# Patient Record
Sex: FEMALE | Race: OTHER | Employment: UNEMPLOYED | URBAN - METROPOLITAN AREA
[De-identification: names, ages, dates, MRNs, and addresses within clinical notes are randomized per-mention and may not be internally consistent; named-entity substitution may affect disease eponyms.]

---

## 2017-07-31 ENCOUNTER — OFFICE VISIT (OUTPATIENT)
Dept: FAMILY MEDICINE CLINIC | Age: 27
End: 2017-07-31

## 2017-07-31 VITALS
WEIGHT: 165.8 LBS | TEMPERATURE: 97.6 F | BODY MASS INDEX: 32.55 KG/M2 | OXYGEN SATURATION: 98 % | HEIGHT: 60 IN | SYSTOLIC BLOOD PRESSURE: 110 MMHG | HEART RATE: 73 BPM | DIASTOLIC BLOOD PRESSURE: 60 MMHG

## 2017-07-31 DIAGNOSIS — Z20.2 EXPOSURE TO STD: Primary | ICD-10-CM

## 2017-07-31 PROCEDURE — 99213 OFFICE O/P EST LOW 20 MIN: CPT | Performed by: NURSE PRACTITIONER

## 2017-07-31 RX ORDER — AZITHROMYCIN 500 MG/1
1000 TABLET, FILM COATED ORAL ONCE
Qty: 2 TABLET | Refills: 0 | Status: SHIPPED | OUTPATIENT
Start: 2017-07-31 | End: 2017-07-31

## 2017-08-01 LAB
C. TRACHOMATIS DNA ,URINE: NEGATIVE
N. GONORRHOEAE DNA, URINE: NEGATIVE

## 2017-08-01 ASSESSMENT — ENCOUNTER SYMPTOMS
EYES NEGATIVE: 1
ABDOMINAL PAIN: 1
RECTAL PAIN: 0
ALLERGIC/IMMUNOLOGIC NEGATIVE: 1
SORE THROAT: 0
RESPIRATORY NEGATIVE: 1

## 2019-10-04 ENCOUNTER — APPOINTMENT (OUTPATIENT)
Dept: GENERAL RADIOLOGY | Age: 29
End: 2019-10-04
Payer: COMMERCIAL

## 2019-10-04 ENCOUNTER — HOSPITAL ENCOUNTER (EMERGENCY)
Age: 29
Discharge: HOME | End: 2019-10-04
Attending: EMERGENCY MEDICINE
Payer: COMMERCIAL

## 2019-10-04 VITALS
RESPIRATION RATE: 16 BRPM | TEMPERATURE: 97.7 F | SYSTOLIC BLOOD PRESSURE: 127 MMHG | OXYGEN SATURATION: 96 % | HEART RATE: 86 BPM | DIASTOLIC BLOOD PRESSURE: 94 MMHG

## 2019-10-04 DIAGNOSIS — S39.012A BACK STRAIN, INITIAL ENCOUNTER: Primary | ICD-10-CM

## 2019-10-04 PROCEDURE — 99283 EMERGENCY DEPT VISIT LOW MDM: CPT

## 2019-10-04 PROCEDURE — 72070 X-RAY EXAM THORAC SPINE 2VWS: CPT

## 2019-10-04 PROCEDURE — 6370000000 HC RX 637 (ALT 250 FOR IP): Performed by: STUDENT IN AN ORGANIZED HEALTH CARE EDUCATION/TRAINING PROGRAM

## 2019-10-04 PROCEDURE — 72100 X-RAY EXAM L-S SPINE 2/3 VWS: CPT

## 2019-10-04 RX ORDER — CYCLOBENZAPRINE HCL 10 MG
10 TABLET ORAL ONCE
Status: COMPLETED | OUTPATIENT
Start: 2019-10-04 | End: 2019-10-04

## 2019-10-04 RX ORDER — LIDOCAINE 4 G/G
1 PATCH TOPICAL ONCE
Status: DISCONTINUED | OUTPATIENT
Start: 2019-10-04 | End: 2019-10-05 | Stop reason: HOSPADM

## 2019-10-04 RX ORDER — LIDOCAINE 4 G/G
1 PATCH TOPICAL DAILY
Status: DISCONTINUED | OUTPATIENT
Start: 2019-10-05 | End: 2019-10-04

## 2019-10-04 RX ADMIN — CYCLOBENZAPRINE HYDROCHLORIDE 10 MG: 10 TABLET, FILM COATED ORAL at 22:17

## 2019-10-04 ASSESSMENT — PAIN DESCRIPTION - FREQUENCY: FREQUENCY: CONTINUOUS

## 2019-10-04 ASSESSMENT — PAIN SCALES - GENERAL: PAINLEVEL_OUTOF10: 5

## 2019-10-04 ASSESSMENT — PAIN DESCRIPTION - PAIN TYPE: TYPE: ACUTE PAIN

## 2019-10-04 ASSESSMENT — PAIN DESCRIPTION - DESCRIPTORS: DESCRIPTORS: ACHING

## 2019-10-04 ASSESSMENT — PAIN DESCRIPTION - LOCATION: LOCATION: BACK

## 2019-10-04 ASSESSMENT — PAIN DESCRIPTION - ORIENTATION: ORIENTATION: RIGHT

## 2019-10-05 ASSESSMENT — ENCOUNTER SYMPTOMS
BACK PAIN: 1
BOWEL INCONTINENCE: 0
GASTROINTESTINAL NEGATIVE: 1
RESPIRATORY NEGATIVE: 1
EYES NEGATIVE: 1

## 2021-09-18 ENCOUNTER — APPOINTMENT (OUTPATIENT)
Dept: GENERAL RADIOLOGY | Age: 31
End: 2021-09-18
Payer: COMMERCIAL

## 2021-09-18 ENCOUNTER — HOSPITAL ENCOUNTER (EMERGENCY)
Age: 31
Discharge: HOME | End: 2021-09-18
Attending: STUDENT IN AN ORGANIZED HEALTH CARE EDUCATION/TRAINING PROGRAM
Payer: COMMERCIAL

## 2021-09-18 VITALS
RESPIRATION RATE: 16 BRPM | HEART RATE: 77 BPM | DIASTOLIC BLOOD PRESSURE: 89 MMHG | BODY MASS INDEX: 32.39 KG/M2 | OXYGEN SATURATION: 99 % | WEIGHT: 165 LBS | TEMPERATURE: 98.6 F | SYSTOLIC BLOOD PRESSURE: 134 MMHG | HEIGHT: 60 IN

## 2021-09-18 DIAGNOSIS — S93.402A SPRAIN OF LEFT ANKLE, UNSPECIFIED LIGAMENT, INITIAL ENCOUNTER: Primary | ICD-10-CM

## 2021-09-18 PROCEDURE — 73610 X-RAY EXAM OF ANKLE: CPT

## 2021-09-18 PROCEDURE — 99284 EMERGENCY DEPT VISIT MOD MDM: CPT

## 2021-09-18 ASSESSMENT — PAIN DESCRIPTION - PAIN TYPE: TYPE: ACUTE PAIN

## 2021-09-18 ASSESSMENT — PAIN SCALES - GENERAL: PAINLEVEL_OUTOF10: 6

## 2021-09-18 NOTE — ED PROVIDER NOTES
[address] [facility]          ATTENDING PHYSICIAN NOTE       Date of evaluation: 9/18/2021    Chief Complaint     Ankle Injury (left ankle pain and swelling from a fall. pt states she tripped over her dogs leash. )      History of Present Illness     Carrington Govea is a 35 y.o. female who presents with left ankle pain. Patient was walking her dog who got excited and ran after something so patient tripped over her dog's leash. She twisted her left ankle. She states that she was able to bear a small amount of weight and \"hobble\" walk. She had swelling and increasing pain so she came to the hospital for evaluation. She denies any numbness in her left foot. She is not taking any ibuprofen and does not want any pain medications at this time. She denies any other injuries to her arms, chest, abdomen, head, neck. Review of Systems     Positive for: Left ankle pain, left ankle swelling  Negative for: Abdominal pain, chest pain, headache    Other systems reviewed and negative. Past Medical, Surgical, Family, and Social History     She has a past medical history of Allergic rhinitis, Concussion, and Hepatitis C virus infection. She has no past surgical history on file. Her family history includes Cancer in her paternal grandfather; Diabetes in her maternal grandmother; Heart Disease in her paternal grandfather and paternal grandmother; High Blood Pressure in her father. She reports that she has quit smoking. Her smoking use included cigarettes. She has a 8.00 pack-year smoking history. She has never used smokeless tobacco. She reports current alcohol use. She reports that she does not use drugs.     Medications     Discharge Medication List as of 9/18/2021  6:35 PM      CONTINUE these medications which have NOT CHANGED    Details   Etonogestrel (NEXPLANON SC) Inject into the skinHistorical Med      valACYclovir (VALTREX) 500 MG tablet Take 500 mg by mouth             Allergies She has No Known Allergies. Physical Exam     INITIAL VITALS: BP: 134/89, Temp: 98.6 °F (37 °C), Pulse: 77, Resp: 16, SpO2: 99 %     General: nontoxic, no acute distress   HEENT: NCAT, EOMI grossly intact, external nose normal, no stridor  Neck: supple, no pain with movement  Cardiac: normal rate, regular rhythm, well perfused  Respiratory: no respiratory distress, no cough  Abdominal: soft, nontender to palpation, no rebound tenderness  Extremities: no pitting edema  Musculoskeletal: Swelling, bruising, tenderness over left lateral malleolus. No significant tenderness on the medial malleolus. No significant tenderness in the left foot. Strong DP pulse in the left foot. Sensation intact in bilateral feet. No left tib-fib or left knee tenderness with palpation. Skin: no diaphoresis, no rash  Neuro: alert and oriented, moving extremities symmetrically, clear speech  Psych: appropriate mood, normal affect    Diagnostic Results     EKG    N/A    RADIOLOGY:  XR ANKLE LEFT (MIN 3 VIEWS)   Final Result      No radiographic evidence of acute fracture. Mild soft tissue swelling lateral aspect of ankle. LABS:   No results found for this visit on 09/18/21. ED BEDSIDE ULTRASOUND:   N/A    RECENT VITALS:  BP: 134/89,Temp: 98.6 °F (37 °C), Pulse: 77, Resp: 16, SpO2: 99 %     Procedures      N/A    ED Course     Nursing Notes, Past Medical Hx, Past Surgical Hx, Social Hx,Allergies, and Family Hx were reviewed. patient was given the following medications:  No orders of the defined types were placed in this encounter. CONSULTS:  None    MEDICAL DECISIONMAKING / ASSESSMENT / PLAN     Mindi Sandhu is a 35 y.o. female who fell and twisted her left ankle. There is now bruising and swelling and tenderness over left lateral malleolus. She has no significant pain in her left foot. Her foot is neurovascularly intact. X-ray of her left ankle show no acute fractures.   This is likely a moderate sprain. Given that she was having some difficulty tolerating weightbearing, I did order a air boot for the patient to assist her with bearing weight while healing from his ankle sprain. I recommended rest, ice, elevation along with NSAIDs. 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 I said that whenever she is ready, I do recommend NSAIDs to help with her symptoms. I recommended that she follow-up with her PCP next week. Clinical Impression     1. Sprain of left ankle, unspecified ligament, initial encounter        Disposition     PATIENT REFERRED TO:  No follow-up provider specified.     DISCHARGE MEDICATIONS:  Discharge Medication List as of 9/18/2021  6:35 PM          DISPOSITION Decision To Discharge 09/18/2021 06:46:02 PM     Brooklyn Robles MD  09/18/21 [id number]